# Patient Record
Sex: MALE | Employment: STUDENT | URBAN - METROPOLITAN AREA
[De-identification: names, ages, dates, MRNs, and addresses within clinical notes are randomized per-mention and may not be internally consistent; named-entity substitution may affect disease eponyms.]

---
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
## 2022-02-11 VITALS
DIASTOLIC BLOOD PRESSURE: 68 MMHG | WEIGHT: 157.8 LBS | HEART RATE: 88 BPM | BODY MASS INDEX: 26.94 KG/M2 | TEMPERATURE: 99.1 F | HEIGHT: 64 IN | SYSTOLIC BLOOD PRESSURE: 122 MMHG

## 2022-02-16 NOTE — PROGRESS NOTES
Patient:   SANTHOSH SOTO            MRN: [id number]            FIN: [id number]               Age:   19 years     Sex:  Male     :  2000   Associated Diagnoses:   Abdominal pain   Author:   Leeroy Stoner MD      Visit Information      Date of Service: 2019 02:20 pm  Performing Location: [facility]  Encounter#: [id number]      Primary Care Provider (PCP):  NONE ,       Referring Provider:  Leeroy Stoner MD    NPI# [id number]      Chief Complaint   2019 2:31 PM CDT    c/o RLQ pain when walking for the past couple of weeks, getting worse        History of Present Illness   Pain on right side with walking for the past couple weeks. Pain does not radiate. Pain does not limit activity. Denies any physical activity. Sitting and sleeping does not bother it. Bowel movements are normal without change recently. Pain increasing with walking.     Had CT abdomen a couple years ago to rule out appendicitis.       Review of Systems   Constitutional:  No fever.    Respiratory:  No shortness of breath.    Vomits in the morning from anxiety      Health Status   Allergies:    Allergic Reactions (Selected)  Severity Not Documented  Sulfa drugs (No reactions were documented)   Medications:  (Selected)   Documented Medications  Documented  Abilify 5 mg oral tablet: = 1 tab(s) ( 5 mg ), Oral, daily, 0 Refill(s), Type: Maintenance  Aleve 220 mg oral tablet: = 1 tab(s) ( 220 mg ), Oral, q8 hrs, 0 Refill(s), Type: Maintenance  Vyvanse 70 mg oral capsule: = 1 cap(s) ( 70 mg ), Oral, qam, 0 Refill(s), Type: Maintenance  ZyrTEC: daily, 0 Refill(s), Type: Maintenance  ferrous sulfate (as elemental iron) 45 mg oral tablet, extended release: = 1 tab(s) ( 45 mg ), Oral, daily, 0 Refill(s), Type: Maintenance   Problem list:    No problem items selected or recorded.      Histories   Social History: [place], [place]      Physical Examination   Vital Signs   2019 2:31 PM CDT Temperature Tympanic 99.1 DegF     Peripheral Pulse Rate 88 bpm    Pulse Site Radial artery    HR Method Manual    Systolic Blood Pressure 122 mmHg    Diastolic Blood Pressure 68 mmHg    Mean Arterial Pressure 86 mmHg    BP Site Right arm    BP Method Manual      Measurements from flowsheet : Measurements   9/16/2019 2:31 PM CDT Height Measured - Standard 64 in    Weight Measured - Standard 157.8 lb    BSA 1.8 m2    Body Mass Index 27.08 kg/m2    Body Mass Index Percentile 89.96    Height/Length Percentile 0.00      General:  Alert and oriented, No acute distress.    Neck:  No lymphadenopathy.    Respiratory:  Lungs are clear to auscultation.    Cardiovascular:  Normal rate, Regular rhythm.    Gastrointestinal:  Soft, Non-distended, tender RLQ.    Musculoskeletal:  Normal gait.    Neurologic:  No focal deficits.    Psychiatric:  Appropriate mood & affect.       Impression and Plan   Diagnosis     Abdominal pain ([id number] R10.9).       Abdominal pain: arrange CT abdomen

## 2022-02-16 NOTE — NURSING NOTE
Comprehensive Intake Entered On:  9/16/2019 2:38 PM CDT    Performed On:  9/16/2019 2:31 PM CDT by Harper Winston CMA               Summary   Chief Complaint :   c/o RLQ pain when walking for the past couple of weeks, getting worse    Weight Measured :   157.8 lb(Converted to: 157 lb 13 oz, 71.58 kg)    Height Measured :   64 in(Converted to: 5 ft 4 in, 162.56 cm)    Body Mass Index :   27.08 kg/m2   Body Surface Area :   1.8 m2   Systolic Blood Pressure :   122 mmHg   Diastolic Blood Pressure :   68 mmHg   Mean Arterial Pressure :   86 mmHg   Peripheral Pulse Rate :   88 bpm   BP Site :   Right arm   Pulse Site :   Radial artery   BP Method :   Manual   HR Method :   Manual   Temperature Tympanic :   99.1 DegF(Converted to: 37.3 DegC)    Harper Winston CMA - 9/16/2019 2:31 PM CDT   Health Status   Allergies Verified? :   Yes   Medication History Verified? :   Yes   Medical History Verified? :   No   Pre-Visit Planning Status :   Not completed   Tobacco Use? :   Current every day smoker   Harper Winston CMA - 9/16/2019 2:31 PM CDT   Problems   (As Of: 9/16/2019 2:38:42 PM CDT)   Meds / Allergies   (As Of: 9/16/2019 2:38:42 PM CDT)   Allergies (Active)   sulfa drugs  Estimated Onset Date:   Unspecified ; Created By:   Harper Winston CMA; Reaction Status:   Active ; Category:   Drug ; Substance:   sulfa drugs ; Type:   Allergy ; Updated By:   Harper Winston CMA; Reviewed Date:   9/16/2019 2:35 PM CDT        Medication List   (As Of: 9/16/2019 2:38:42 PM CDT)   Home Meds    aripiprazole  :   aripiprazole ; Status:   Documented ; Ordered As Mnemonic:   Abilify 5 mg oral tablet ; Simple Display Line:   5 mg, 1 tab(s), Oral, daily, 0 Refill(s) ; Catalog Code:   aripiprazole ; Order Dt/Tm:   9/16/2019 2:36:01 PM          cetirizine  :   cetirizine ; Status:   Documented ; Ordered As Mnemonic:   ZyrTEC ; Simple Display Line:   daily, 0 Refill(s) ; Catalog Code:   cetirizine ; Order Dt/Tm:   9/16/2019 2:36:47 PM           ferrous sulfate  :   ferrous sulfate ; Status:   Documented ; Ordered As Mnemonic:   ferrous sulfate (as elemental iron) 45 mg oral tablet, extended release ; Simple Display Line:   45 mg, 1 tab(s), Oral, daily, 0 Refill(s) ; Catalog Code:   ferrous sulfate ; Order Dt/Tm:   9/16/2019 2:36:14 PM          lisdexamfetamine  :   lisdexamfetamine ; Status:   Documented ; Ordered As Mnemonic:   Vyvanse 70 mg oral capsule ; Simple Display Line:   70 mg, 1 cap(s), Oral, qam, 0 Refill(s) ; Catalog Code:   lisdexamfetamine ; Order Dt/Tm:   9/16/2019 2:35:21 PM          naproxen  :   naproxen ; Status:   Documented ; Ordered As Mnemonic:   Aleve 220 mg oral tablet ; Simple Display Line:   220 mg, 1 tab(s), Oral, q8 hrs, 0 Refill(s) ; Catalog Code:   naproxen ; Order Dt/Tm:   9/16/2019 2:36:28 PM

## 2022-02-16 NOTE — TELEPHONE ENCOUNTER
---------------------  From: Guera Wooten CMA (Phone Messages Pool ([id number]_[facility]))   To: [facility] Message Pool ([id number]_[place] [facility] [place]);     Sent: 9/19/2019 11:14:34 AM CDT  Subject: PA for CT scan         Phone Message    PCP:   CELE      Time of Call:  10:51am       Person Calling:  Maddi from the Hospital  Phone number:  [phone]      Note:   Maddi LM stating that pt's CT scan needs to have a peer to peer done. She would like a call back to let her know the outcome.  Peer-to peer number: [phone]  Reference number: [id number]---------------------  From: Harper Winston CMA ([facility] Message Pool ([id number]_[facility]))   To: Leeroy Stoner MD;     Sent: 9/19/2019 12:09:01 PM CDT  Subject: FW: PA for CT scanTalked with Physician for approvalscan was approved until Nov 17, 2019, auth # brought over to [facility]      DAT Winston CMA

## 2022-02-16 NOTE — NURSING NOTE
Depression Screening Entered On:  9/25/2019 10:57 AM CDT    Performed On:  9/16/2019 10:56 AM CDT by Peggy John               Depression Screening   Little Interest - Pleasure in Activities :   More than half the days   Feeling Down, Depressed, Hopeless :   More than half the days   Initial Depression Screen Score :   4    Trouble Falling or Staying Asleep :   Not at all   Feeling Tired or Little Energy :   Several days   Poor Appetite or Overeating :   Not at all   Feeling Bad About Yourself :   Nearly every day   Trouble Concentrating :   Several days   Moving or Speaking Slowly :   Not at all   Thoughts Better Off Dead or Hurting Self :   More than half the days   Detailed Depression Screen Score :   7    Total Depression Screen Score :   11    TRISH Difficulty with Work, Home, Others :   Very difficult   Peggy John - 9/25/2019 10:56 AM CDT